# Patient Record
Sex: MALE | Race: WHITE | Employment: FULL TIME | URBAN - METROPOLITAN AREA
[De-identification: names, ages, dates, MRNs, and addresses within clinical notes are randomized per-mention and may not be internally consistent; named-entity substitution may affect disease eponyms.]

---

## 2017-02-09 PROBLEM — I42.9 CARDIOMYOPATHY (HCC): Status: ACTIVE | Noted: 2017-02-09

## 2017-02-09 PROBLEM — E78.2 MIXED HYPERLIPIDEMIA: Status: ACTIVE | Noted: 2017-02-09

## 2017-02-09 PROBLEM — I10 ESSENTIAL HYPERTENSION: Status: ACTIVE | Noted: 2017-02-09

## 2017-02-09 PROBLEM — I50.22 CHRONIC SYSTOLIC HEART FAILURE (HCC): Status: ACTIVE | Noted: 2017-02-09

## 2017-02-09 PROBLEM — N18.6 ESRD (END STAGE RENAL DISEASE) (HCC): Status: ACTIVE | Noted: 2017-02-09

## 2017-02-09 PROBLEM — F41.9 ANXIETY: Status: ACTIVE | Noted: 2017-02-09

## 2018-05-15 ENCOUNTER — HOSPITAL ENCOUNTER (OUTPATIENT)
Dept: LAB | Age: 48
Discharge: HOME | End: 2018-05-15
Payer: COMMERCIAL

## 2018-05-15 DIAGNOSIS — N18.6 ESRD (END STAGE RENAL DISEASE) (HCC): ICD-10-CM

## 2018-05-15 DIAGNOSIS — I10 ESSENTIAL HYPERTENSION: ICD-10-CM

## 2018-05-15 DIAGNOSIS — E78.2 MIXED HYPERLIPIDEMIA: ICD-10-CM

## 2018-05-15 DIAGNOSIS — I50.22 CHRONIC SYSTOLIC HEART FAILURE (HCC): ICD-10-CM

## 2018-05-15 LAB
ANION GAP SERPL CALC-SCNC: 8 MMOL/L
BASOPHILS # BLD: 0.1 K/UL (ref 0–0.2)
BASOPHILS NFR BLD: 1 % (ref 0–2)
BUN SERPL-MCNC: 33 MG/DL (ref 6–23)
CALCIUM SERPL-MCNC: 9.2 MG/DL (ref 8.3–10.4)
CHLORIDE SERPL-SCNC: 99 MMOL/L (ref 98–107)
CO2 SERPL-SCNC: 30 MMOL/L (ref 21–32)
CREAT SERPL-MCNC: 7.5 MG/DL (ref 0.8–1.5)
DIFFERENTIAL METHOD BLD: ABNORMAL
EOSINOPHIL # BLD: 0.2 K/UL (ref 0–0.8)
EOSINOPHIL NFR BLD: 3 % (ref 0.5–7.8)
ERYTHROCYTE [DISTWIDTH] IN BLOOD BY AUTOMATED COUNT: 13.1 % (ref 11.9–14.6)
GLUCOSE SERPL-MCNC: 296 MG/DL (ref 65–100)
HCT VFR BLD AUTO: 42.8 % (ref 41.1–50.3)
HGB BLD-MCNC: 14.8 G/DL (ref 13.6–17.2)
INR PPP: 0.9
LYMPHOCYTES # BLD: 0.9 K/UL (ref 0.5–4.6)
LYMPHOCYTES NFR BLD: 14 % (ref 13–44)
MCH RBC QN AUTO: 35.2 PG (ref 26.1–32.9)
MCHC RBC AUTO-ENTMCNC: 34.6 G/DL (ref 31.4–35)
MCV RBC AUTO: 101.7 FL (ref 79.6–97.8)
MONOCYTES # BLD: 0.7 K/UL (ref 0.1–1.3)
MONOCYTES NFR BLD: 11 % (ref 4–12)
NEUTS SEG # BLD: 4.5 K/UL (ref 1.7–8.2)
NEUTS SEG NFR BLD: 71 % (ref 43–78)
PLATELET # BLD AUTO: 144 K/UL (ref 150–450)
PMV BLD AUTO: 10.4 FL (ref 10.8–14.1)
POTASSIUM SERPL-SCNC: 4.6 MMOL/L (ref 3.5–5.1)
PROTHROMBIN TIME: 12.6 SEC (ref 11.5–14.5)
RBC # BLD AUTO: 4.21 M/UL (ref 4.23–5.67)
SODIUM SERPL-SCNC: 137 MMOL/L (ref 136–145)
WBC # BLD AUTO: 6.4 K/UL (ref 4.3–11.1)

## 2018-05-15 PROCEDURE — 85025 COMPLETE CBC W/AUTO DIFF WBC: CPT | Performed by: INTERNAL MEDICINE

## 2018-05-15 PROCEDURE — 85610 PROTHROMBIN TIME: CPT | Performed by: INTERNAL MEDICINE

## 2018-05-15 PROCEDURE — 36415 COLL VENOUS BLD VENIPUNCTURE: CPT | Performed by: INTERNAL MEDICINE

## 2018-05-15 PROCEDURE — 80048 BASIC METABOLIC PNL TOTAL CA: CPT | Performed by: INTERNAL MEDICINE

## 2018-05-25 NOTE — PROGRESS NOTES
Called to pre-assess for [facility] poss with DR Sissy Christian , Scheduled 5/29/18. No answer & message left.

## 2018-05-25 NOTE — PROGRESS NOTES
Spoke with patients mother who has previously been given medical release authorization, brief pre-assessment completed, Instructed NPO after midnight on Monday, arrival time is 6am on 5/29/18 with procedure time at [address] [facility] to bring all medications in their labeled bottles, bring overnight bag & where to sign in. Instructed pt can call me at [phone] if he has any questions.  Voiced understanding & states she will inform patient

## 2018-05-29 ENCOUNTER — HOSPITAL ENCOUNTER (OUTPATIENT)
Dept: CARDIAC CATH/INVASIVE PROCEDURES | Age: 48
Discharge: HOME | End: 2018-05-29
Attending: INTERNAL MEDICINE | Admitting: INTERNAL MEDICINE
Payer: COMMERCIAL

## 2018-05-29 VITALS
HEIGHT: 67 IN | RESPIRATION RATE: 16 BRPM | WEIGHT: 196 LBS | DIASTOLIC BLOOD PRESSURE: 63 MMHG | OXYGEN SATURATION: 97 % | HEART RATE: 75 BPM | BODY MASS INDEX: 30.76 KG/M2 | TEMPERATURE: 97.9 F | SYSTOLIC BLOOD PRESSURE: 135 MMHG

## 2018-05-29 LAB
ANION GAP SERPL CALC-SCNC: 11 MMOL/L (ref 7–16)
ATRIAL RATE: 71 BPM
BUN SERPL-MCNC: 40 MG/DL (ref 6–23)
CALCIUM SERPL-MCNC: 8.7 MG/DL (ref 8.3–10.4)
CALCULATED P AXIS, ECG09: 15 DEGREES
CALCULATED R AXIS, ECG10: 0 DEGREES
CALCULATED T AXIS, ECG11: 15 DEGREES
CHLORIDE SERPL-SCNC: 103 MMOL/L (ref 98–107)
CO2 SERPL-SCNC: 24 MMOL/L (ref 21–32)
CREAT SERPL-MCNC: 6.77 MG/DL (ref 0.8–1.5)
DIAGNOSIS, 93000: NORMAL
ERYTHROCYTE [DISTWIDTH] IN BLOOD BY AUTOMATED COUNT: 13.3 % (ref 11.9–14.6)
GLUCOSE SERPL-MCNC: 282 MG/DL (ref 65–100)
HCT VFR BLD AUTO: 42.7 % (ref 41.1–50.3)
HGB BLD-MCNC: 15 G/DL (ref 13.6–17.2)
INR PPP: 1
MCH RBC QN AUTO: 35.4 PG (ref 26.1–32.9)
MCHC RBC AUTO-ENTMCNC: 35.1 G/DL (ref 31.4–35)
MCV RBC AUTO: 100.7 FL (ref 79.6–97.8)
P-R INTERVAL, ECG05: 182 MS
PLATELET # BLD AUTO: 143 K/UL (ref 150–450)
PMV BLD AUTO: 11.7 FL (ref 10.8–14.1)
POTASSIUM SERPL-SCNC: 4.1 MMOL/L (ref 3.5–5.1)
PROTHROMBIN TIME: 12.7 SEC (ref 11.5–14.5)
Q-T INTERVAL, ECG07: 398 MS
QRS DURATION, ECG06: 82 MS
QTC CALCULATION (BEZET), ECG08: 432 MS
RBC # BLD AUTO: 4.24 M/UL (ref 4.23–5.67)
SODIUM SERPL-SCNC: 138 MMOL/L (ref 136–145)
VENTRICULAR RATE, ECG03: 71 BPM
WBC # BLD AUTO: 5.8 K/UL (ref 4.3–11.1)

## 2018-05-29 PROCEDURE — 74011636320 HC RX REV CODE- 636/320: Performed by: INTERNAL MEDICINE

## 2018-05-29 PROCEDURE — 74011250636 HC RX REV CODE- 250/636

## 2018-05-29 PROCEDURE — 74011000258 HC RX REV CODE- 258: Performed by: INTERNAL MEDICINE

## 2018-05-29 PROCEDURE — 77030004558 HC CATH ANGI DX SUPR TORQ CARD -A

## 2018-05-29 PROCEDURE — C1760 CLOSURE DEV, VASC: HCPCS

## 2018-05-29 PROCEDURE — 85610 PROTHROMBIN TIME: CPT | Performed by: INTERNAL MEDICINE

## 2018-05-29 PROCEDURE — 80048 BASIC METABOLIC PNL TOTAL CA: CPT | Performed by: INTERNAL MEDICINE

## 2018-05-29 PROCEDURE — 74011000250 HC RX REV CODE- 250: Performed by: INTERNAL MEDICINE

## 2018-05-29 PROCEDURE — C1894 INTRO/SHEATH, NON-LASER: HCPCS

## 2018-05-29 PROCEDURE — 99152 MOD SED SAME PHYS/QHP 5/>YRS: CPT

## 2018-05-29 PROCEDURE — 93005 ELECTROCARDIOGRAM TRACING: CPT | Performed by: INTERNAL MEDICINE

## 2018-05-29 PROCEDURE — 74011250636 HC RX REV CODE- 250/636: Performed by: INTERNAL MEDICINE

## 2018-05-29 PROCEDURE — 77030004534 HC CATH ANGI DX INFN CARD -A

## 2018-05-29 PROCEDURE — 93458 L HRT ARTERY/VENTRICLE ANGIO: CPT

## 2018-05-29 PROCEDURE — 85027 COMPLETE CBC AUTOMATED: CPT | Performed by: INTERNAL MEDICINE

## 2018-05-29 PROCEDURE — 77030004559 HC CATH ANGI DX SUPT CARD -B

## 2018-05-29 RX ORDER — DIAZEPAM 5 MG/1
5 TABLET ORAL ONCE
Status: DISCONTINUED | OUTPATIENT
Start: 2018-05-29 | End: 2018-05-29 | Stop reason: HOSPADM

## 2018-05-29 RX ORDER — SODIUM CHLORIDE 9 MG/ML
75 INJECTION, SOLUTION INTRAVENOUS CONTINUOUS
Status: DISCONTINUED | OUTPATIENT
Start: 2018-05-29 | End: 2018-05-29 | Stop reason: HOSPADM

## 2018-05-29 RX ORDER — FENTANYL CITRATE 50 UG/ML
25-100 INJECTION, SOLUTION INTRAMUSCULAR; INTRAVENOUS
Status: DISCONTINUED | OUTPATIENT
Start: 2018-05-29 | End: 2018-05-29

## 2018-05-29 RX ORDER — MIDAZOLAM HYDROCHLORIDE 1 MG/ML
.5-2 INJECTION, SOLUTION INTRAMUSCULAR; INTRAVENOUS
Status: DISCONTINUED | OUTPATIENT
Start: 2018-05-29 | End: 2018-05-29

## 2018-05-29 RX ORDER — LIDOCAINE HYDROCHLORIDE 20 MG/ML
60-200 INJECTION, SOLUTION INFILTRATION; PERINEURAL
Status: DISCONTINUED | OUTPATIENT
Start: 2018-05-29 | End: 2018-05-29

## 2018-05-29 RX ORDER — GUAIFENESIN 100 MG/5ML
81-324 LIQUID (ML) ORAL ONCE
Status: DISCONTINUED | OUTPATIENT
Start: 2018-05-29 | End: 2018-05-29 | Stop reason: HOSPADM

## 2018-05-29 RX ORDER — HEPARIN SODIUM 200 [USP'U]/100ML
3 INJECTION, SOLUTION INTRAVENOUS CONTINUOUS
Status: DISCONTINUED | OUTPATIENT
Start: 2018-05-29 | End: 2018-05-29

## 2018-05-29 RX ORDER — SODIUM CHLORIDE 0.9 % (FLUSH) 0.9 %
5-10 SYRINGE (ML) INJECTION EVERY 8 HOURS
Status: DISCONTINUED | OUTPATIENT
Start: 2018-05-29 | End: 2018-05-29 | Stop reason: HOSPADM

## 2018-05-29 RX ORDER — SODIUM CHLORIDE 0.9 % (FLUSH) 0.9 %
5-10 SYRINGE (ML) INJECTION AS NEEDED
Status: DISCONTINUED | OUTPATIENT
Start: 2018-05-29 | End: 2018-05-29 | Stop reason: HOSPADM

## 2018-05-29 RX ADMIN — CEFAZOLIN SODIUM 1 G: 1 INJECTION, POWDER, FOR SOLUTION INTRAMUSCULAR; INTRAVENOUS at 08:39

## 2018-05-29 RX ADMIN — HEPARIN SODIUM 3 ML/HR: 200 INJECTION, SOLUTION INTRAVENOUS at 08:29

## 2018-05-29 RX ADMIN — LIDOCAINE HYDROCHLORIDE 150 MG: 20 INJECTION, SOLUTION INFILTRATION; PERINEURAL at 08:24

## 2018-05-29 RX ADMIN — MIDAZOLAM HYDROCHLORIDE 2 MG: 1 INJECTION, SOLUTION INTRAMUSCULAR; INTRAVENOUS at 08:21

## 2018-05-29 RX ADMIN — IOPAMIDOL 90 ML: 755 INJECTION, SOLUTION INTRAVENOUS at 08:41

## 2018-05-29 RX ADMIN — FENTANYL CITRATE 25 MCG: 50 INJECTION, SOLUTION INTRAMUSCULAR; INTRAVENOUS at 08:26

## 2018-05-29 RX ADMIN — FENTANYL CITRATE 25 MCG: 50 INJECTION, SOLUTION INTRAMUSCULAR; INTRAVENOUS at 08:21

## 2018-05-29 RX ADMIN — MIDAZOLAM HYDROCHLORIDE 1 MG: 1 INJECTION, SOLUTION INTRAMUSCULAR; INTRAVENOUS at 08:26

## 2018-05-29 RX ADMIN — SODIUM CHLORIDE 75 ML/HR: 900 INJECTION, SOLUTION INTRAVENOUS at 07:00

## 2018-05-29 NOTE — PROCEDURES
Brief Cardiac Procedure Note    Patient: Sunni Lama MRN: [id number]  SSN: [id number]    YOB: 1970  Age: 56 y.o. Sex: male      Date of Procedure: 5/29/2018     Pre-procedure Diagnosis: pre- op kidney tx    Post-procedure Diagnosis: same    Reason for Procedure: Preoperative Evaluation    Procedure: Left Heart Catheterization    Brief Description of Procedure: lhc via right fem, mynx    Performed By: Nathalia Sargent MD     Assistants: none    Anesthesia: Moderate Sedation    Estimated Blood Loss: Less than 10 mL      Specimens: None    Implants: None    Findings: normal cors and ef    Complications: None    Recommendations: Continue medical therapy.     Signed By: Nathalia Sargent MD     May 29, 2018

## 2018-05-29 NOTE — PROGRESS NOTES
Patient received to [address] [place] [address] room # 10  Ambulatory from [facility]. Patient scheduled for [facility] today with Dr Barbara Anthony. Procedure reviewed & questions answered, voiced good understanding consent obtained & placed on chart. All medications and medical history reviewed. Will prep patient per orders. Patient & family updated on plan of care. The patient has a fraility score of 4-VULNERABLE, based on history of stroke and chronic kidney failure.

## 2018-05-29 NOTE — IP AVS SNAPSHOT
[address] 
[phone] Patient: Desiree Ferrer 
MRN: [id number] Discharge Summary 2018 Yazan Bazan II  
 MRN[de-identified]  [id number] Admission Information Provider Pager Service Admission Date Expected D/C Date Brianne Capone MD  CARDIAC CATH LAB 2018 Actual LOS Patient Class 0 days OUTPATIENT Follow-up Information Follow up With Details Comments Contact Info Brianne Capone MD On 2018 at 10:00 a.m.  [address] [facility] [address] 
[phone] My Medications ASK your physician about these medications Instructions Each Dose to Equal  
 Morning Noon Evening Bedtime  
 aspirin delayed-release 81 mg tablet Your last dose was: Your next dose is: Take 1 Tab by mouth daily. Strength: 81 mg; Form: enteric coated tablet; SIG: take 1 tab(s) orally once a day 81 mg  
    
   
   
   
  
 carvedilol 25 mg tablet Commonly known as:  [place] Your last dose was: Your next dose is: Take 1 Tab by mouth two (2) times a day. 25 mg  
    
   
   
   
  
 colestipol 1 gram tablet Commonly known as:  COLESTID Your last dose was: Your next dose is:    
   
   
      
   
   
   
  
 cyclobenzaprine 10 mg tablet Commonly known as:  FLEXERIL Your last dose was: Your next dose is: Take  by mouth three (3) times daily as needed for Muscle Spasm(s). furosemide 80 mg tablet Commonly known as:  LASIX Your last dose was: Your next dose is: Take 1 Tab by mouth two (2) times a day. 80 mg  
    
   
   
   
  
 glucagon 1 mg injection Commonly known as:  Maud [place] Your last dose was: Your next dose is: Strength: ; Form: PRN severe hypoglycemia; SIG: take as directed intramuscular as directed  
     
   
   
   
  
 hydrALAZINE 100 mg tablet Commonly known as:  APRESOLINE Your last dose was: Your next dose is: Take 1 Tab by mouth three (3) times daily. 100 mg  
    
   
   
   
  
 insulin aspart U-100 100 unit/mL injection Commonly known as:  Berry Dace Your last dose was: Your next dose is:    
   
   
 by SubCUTAneous route. INSULIN PUMP LORazepam 1 mg tablet Commonly known as:  ATIVAN Your last dose was: Your next dose is: TAKE 1 TABLET BY MOUTH DAILY AS NEEDED FOR ANXIETY  
     
   
   
   
  
 simvastatin 10 mg tablet Commonly known as:  ZOCOR Your last dose was: Your next dose is: Take 1 Tab by mouth nightly. Strength: 10 mg; Form: tablet; SIG: take 1 tab(s) orally once a day (at bedtime) 10 mg  
    
   
   
   
  
 VELPHORO 500 mg Chew chewable tablet Generic drug:  sucroferric oxyhydroxide Your last dose was: Your next dose is: Take  by mouth three (3) times daily (with meals). General Information Please provide this summary of care documentation to your next provider. Allergies No Known Allergies Current Immunizations  Reviewed on 2/9/2017 Name Date Pneumococcal Polysaccharide (PPSV-23) 2/4/2009 Tdap 8/27/2012 Discharge Instructions Discharge Instructions HEART CATHETERIZATION/ANGIOGRAPHY DISCHARGE INSTRUCTIONS 1. Check puncture site frequently for swelling or bleeding. If there is any bleeding, lie down and apply pressure over the area with a clean towel or washcloth. Notify your doctor for any redness, swelling, drainage, or oozing from the puncture site. Notify your doctor for any fever or chills. 2. If the extremity becomes cold, numb, or painful call Dr. Reena Dunaway at [phone]. 
3. Activity should be limited for the next 48 hours. Climb stairs as little as possible and avoid any stooping, bending, or strenuous activity for 48 hours. No heavy lifting (anything over 10 pounds) for 3 days. 4. You may resume your usual diet. Drink more fluids than usual. 
5. Have a responsible person drive you home and stay with you for at least 24 hours after your heart catheterization/angiography. 6. You may remove bandage from your Right groin in 24 hours. You may shower in 24 hours. No tub baths, hot tubs, or swimming for 1 week. Do not place any lotions, creams, powders, or ointments over puncture site for 1 week. You may place a clean band-aid over the puncture site each day for 5 days. Change daily. 7. Do not drive for 24 hours. I have read the above instructions and have had the opportunity to ask questions. Patient: ________________________   Date: 5/29/2018 Witness: _______________________   Date: 5/29/2018 Discharge Orders None  
  
` Patient Signature:  ____________________________________________________________ Date:  ____________________________________________________________  
  
 Lizeth Nava Provider Signature:  ____________________________________________________________ Date:  ____________________________________________________________

## 2018-05-29 NOTE — PROGRESS NOTES
Report received from Ru Sandy [id number]. Procedural findings communicated. Intra procedural  medication administration reviewed. Progression of care discussed.      Patient received into [address] 1 post sheath removal.     Access site without bleeding or swelling yes    Dressing dry and intact yes    Patient instructed to limit movement to right lower extremity    Routine post procedural vital signs and site assessment initiated yes

## 2018-05-29 NOTE — PROGRESS NOTES
Discharge instructions reviewed with patient and fiance. Verbalize understanding. Written instructions given.

## 2018-05-29 NOTE — PROGRESS NOTES
TRANSFER - OUT REPORT:    Verbal report given to Jose Cuba RN (name) on Saritha Burger II  being transferred to [address] [place] [address] ([facility]) for routine progression of care       Report consisted of patients Situation, Background, Assessment and   Recommendations(SBAR). Information from the following report(s) SBAR was reviewed with the receiving nurse. Lines:   Peripheral IV 05/29/18 Left Hand (Active)   Site Assessment Clean, dry, & intact; Clean;Dry 5/29/2018  7:04 AM   Phlebitis Assessment 0 5/29/2018  7:04 AM   Infiltration Assessment 0 5/29/2018  7:04 AM   Dressing Status Clean, dry, & intact; Clean;Dry 5/29/2018  7:04 AM   Dressing Type Tape;Transparent 5/29/2018  7:04 AM   Hub Color/Line Status Patent; Infusing;Pink 5/29/2018  7:04 AM        Opportunity for questions and clarification was provided. Patient transported with:   Tech           Pt had LHC via RFA. Site closed with Mynx. Pt received Versed 3mg IV, Fentanyl 50mcg IV, and Ancef 1g IVPB.

## 2018-05-29 NOTE — PROGRESS NOTES
Discharged to home accompanied by garfield. Left unit via wheelchair. Right groin without bleeding or hematoma.

## 2018-05-29 NOTE — PROCEDURES
[address] [facility] Alexandru Zheng  MR#: [id number]  : 1970  ACCOUNT #: [de-identified]   DATE OF SERVICE: 2018    SURGEON:  Pascual Soriano MD    CLINICAL INFORMATION:  The patient with long term diabetes, end-stage renal disease who presents for pre-kidney transplant cardiac catheterization recommended by [address]. PROCEDURE DETAILS:  After informed consent was obtained, a 6-Mauritanian sheath was placed in the right femoral artery, a 6-Mauritanian JL4 Ge right angled pigtail were used for diagnostic angiography. Sheathogram confirms common femoral artery sheath placement. A Mynx Closure Device was placed with good hemostasis. Conscious sedation was given by Fátima Barron RN, beginning at 8:23 and concluding at 8:41  under my supervision. A total of 3 mg Versed, 50 mcg fentanyl were given. Vital signs and saturations were stable throughout. FINDINGS:  The left main coronary artery is in normal anatomic position and free of significant disease, bifurcates the LAD and circumflex system. The LAD has a small to moderate sized proximal diagonal branch, small distal diagonal branch. There is no atherosclerosis seen in the LAD system. The circumflex has a moderate sized midvessel obtuse marginal branch and a small ongoing AV groove circumflex. There is no atherosclerosis seen in the circumflex system. The right coronary artery is a large, dominant vessel in a normal anatomic position. There is no atherosclerosis seen in the RCA, PDA or posterolateral obtuse marginal branches. Left ventriculogram reveals normal systolic function. Ejection fraction is 55%. Left ventricular end-diastolic pressure is 18 mmHg with an opening aortic pressure of 129/66. No gradient across the aortic valve. The Mynx Closure Device was placed with good hemostasis. CONCLUSION:  Normal coronary angiography with normal LV systolic function.     CC:  [facility] - Stephanie Borrego MD       [facility] / RN  D: 05/29/2018 08:59     T: 05/29/2018 09:29  JOB #: [id number]

## 2018-05-29 NOTE — PROGRESS NOTES
IP Consult to Cardiac Rehab. Chart review completed. No qualifying diagnosis noted for Cardiac Rehab. We will contact the patient if a qualifying referral is received.

## 2018-05-29 NOTE — DISCHARGE INSTRUCTIONS
HEART CATHETERIZATION/ANGIOGRAPHY DISCHARGE INSTRUCTIONS    1. Check puncture site frequently for swelling or bleeding. If there is any bleeding, lie down and apply pressure over the area with a clean towel or washcloth. Notify your doctor for any redness, swelling, drainage, or oozing from the puncture site. Notify your doctor for any fever or chills. 2. If the extremity becomes cold, numb, or painful call Dr. Kamryn Hernandez at [phone].  3. Activity should be limited for the next 48 hours. Climb stairs as little as possible and avoid any stooping, bending, or strenuous activity for 48 hours. No heavy lifting (anything over 10 pounds) for 3 days. 4. You may resume your usual diet. Drink more fluids than usual.  5. Have a responsible person drive you home and stay with you for at least 24 hours after your heart catheterization/angiography. 6. You may remove bandage from your Right groin in 24 hours. You may shower in 24 hours. No tub baths, hot tubs, or swimming for 1 week. Do not place any lotions, creams, powders, or ointments over puncture site for 1 week. You may place a clean band-aid over the puncture site each day for 5 days. Change daily. 7. Do not drive for 24 hours. I have read the above instructions and have had the opportunity to ask questions.       Patient: ________________________   Date: 5/29/2018    Witness: _______________________   Date: 5/29/2018